# Patient Record
Sex: FEMALE | Race: WHITE | NOT HISPANIC OR LATINO | URBAN - METROPOLITAN AREA
[De-identification: names, ages, dates, MRNs, and addresses within clinical notes are randomized per-mention and may not be internally consistent; named-entity substitution may affect disease eponyms.]

---

## 2017-10-17 ENCOUNTER — ALLIED HEALTH/NURSE VISIT (OUTPATIENT)
Dept: FAMILY MEDICINE | Facility: OTHER | Age: 22
End: 2017-10-17
Payer: COMMERCIAL

## 2017-10-17 ENCOUNTER — TELEPHONE (OUTPATIENT)
Dept: OBGYN | Facility: OTHER | Age: 22
End: 2017-10-17

## 2017-10-17 VITALS
HEIGHT: 67 IN | HEART RATE: 97 BPM | OXYGEN SATURATION: 98 % | DIASTOLIC BLOOD PRESSURE: 64 MMHG | SYSTOLIC BLOOD PRESSURE: 105 MMHG | BODY MASS INDEX: 24.17 KG/M2 | WEIGHT: 154 LBS | RESPIRATION RATE: 16 BRPM

## 2017-10-17 DIAGNOSIS — Z32.01 PREGNANCY TEST POSITIVE: Primary | ICD-10-CM

## 2017-10-17 DIAGNOSIS — Z32.00 UNCONFIRMED PREGNANCY: Primary | ICD-10-CM

## 2017-10-17 DIAGNOSIS — Z32.01 PREGNANCY EXAMINATION OR TEST, POSITIVE RESULT: ICD-10-CM

## 2017-10-17 LAB
B-HCG SERPL-ACNC: 470 IU/L (ref 0–5)
BETA HCG QUAL IFA URINE: POSITIVE

## 2017-10-17 PROCEDURE — 36415 COLL VENOUS BLD VENIPUNCTURE: CPT | Performed by: ADVANCED PRACTICE MIDWIFE

## 2017-10-17 PROCEDURE — 84702 CHORIONIC GONADOTROPIN TEST: CPT | Performed by: ADVANCED PRACTICE MIDWIFE

## 2017-10-17 PROCEDURE — 84703 CHORIONIC GONADOTROPIN ASSAY: CPT | Performed by: ADVANCED PRACTICE MIDWIFE

## 2017-10-17 PROCEDURE — 99207 ZZC NO CHARGE NURSE ONLY: CPT

## 2017-10-17 RX ORDER — METHIMAZOLE 5 MG/1
5 TABLET ORAL DAILY
COMMUNITY
Start: 2017-09-01 | End: 2020-02-03

## 2017-10-17 RX ORDER — LABETALOL 100 MG/1
100 TABLET, FILM COATED ORAL 2 TIMES DAILY
COMMUNITY
Start: 2017-09-01 | End: 2020-02-03

## 2017-10-17 ASSESSMENT — PAIN SCALES - GENERAL: PAINLEVEL: NO PAIN (0)

## 2017-10-17 NOTE — PROGRESS NOTES
Annie Alarcon is a 21 year old here today for a pregnancy test.  LMP: Patient's last menstrual period was 09/10/2017.  Wt: 154 lbs 0 oz.    Symptoms include weight gain, breast tenderness, absence of menses, nausea &/or vomiting and break out.    Annie informed of positive pregnancy test results. JEFFRY: 2018    Educational advice given: nutrition, smoking, drugs & alcohol and mediations.    Current medications reviewed: Yes    Previous pregnancy history remarkable for: none.    Plan: follow-up appointment with Marleny Lozano CNM for pre-julianna care, take multivitamin or pre- vitamins and OB Education packet given.    She is to call back if she has any questions or concerns.  She is advised to notify a provider immediately if she experiences any severe cramping or abdominal pain or any vaginal bleeding.    Discussed medications are class C and D. Advised to contact prescribing provider to discuss Risk verse Benefit of medications and fetus.     Patient is requesting a Dating ultrasound. New patient to [place] but would like to follow here for her pregnancy. Previously seen in [facility] and [facility].    HCG Quant completed in clinic today.  Elida Martinez, RN, BSN

## 2017-10-17 NOTE — MR AVS SNAPSHOT
After Visit Summary   10/17/2017    Annie Alarcon    MRN: [id number]           Patient Information     Date Of Birth          1995        Visit Information        Provider Department      10/17/2017 10:00 AM NL RN TEAM A, SARAH BETH [facility]        Today's Diagnoses     Unconfirmed pregnancy    -  1      Care Instructions    According to your last menstrual period, you are approximately 5 weeks and 2 days pregnant.  Your estimated due date is 06/17/2018.    To do list:  1. If you have not already started taking a prenatal vitamin, please start today.  2. The first OB visit with Marleny Chriss Lozano is scheduled between 10 and 12 weeks: 12/01/2017 at 9am in [place].    To make these appointments, or if you have any questions and would like to speak to your care team, you can call the clinic's main phone number:       [facility]: [phone]       [facility]: [phone]       [place] [facility]: [phone]       [facility]: [phone]       [facility]: [phone]    Please remember, we would like to hear from you if you have any vaginal bleeding or any moderate to severe abdominal pain/cramping. You can call any of the phone numbers above and speak with an RN promptly, even if it is after clinic hours, someone will answer your call.    Thank you for choosing [place], and Congratulations!                Follow-ups after your visit        Your next 10 appointments already scheduled     Dec 01, 2017  9:00 AM CST   New Prenatal with SAL Trevizo CNNIKKI   [facility]    [address] [facility] [id number] [phone]              Who to contact     If you have questions or need follow up information about today's clinic visit or your schedule please contact [facility] directly at [phone].  Normal or non-critical lab and imaging results will be communicated to you by [place]  "letter or phone within 4 business days after the clinic has received the results. If you do not hear from us within 7 days, please contact the clinic through iTwin or phone. If you have a critical or abnormal lab result, we will notify you by phone as soon as possible.  Submit refill requests through iTwin or call your pharmacy and they will forward the refill request to us. Please allow 3 business days for your refill to be completed.          Additional Information About Your Visit        iTwin Information     iTwin lets you send messages to your doctor, view your test results, renew your prescriptions, schedule appointments and more. To sign up, go to [email or web address] . Click on \"Log in\" on the left side of the screen, which will take you to the Welcome page. Then click on \"Sign up Now\" on the right side of the page.     You will be asked to enter the access code listed below, as well as some personal information. Please follow the directions to create your username and password.     Your access code is: [email or web address]  Expires: 1/15/2018 10:41 AM     Your access code will  in 90 days. If you need help or a new code, please call your [place] clinic or [phone].        Care EveryWhere ID     This is your Care EveryWhere ID. This could be used by other organizations to access your [place] medical records  [id number]        Your Vitals Were     Pulse Respirations Height Last Period Pulse Oximetry Breastfeeding?    97 16 5' 7.28\" (1.709 m) 09/10/2017 98% No    BMI (Body Mass Index)                   23.92 kg/m2            Blood Pressure from Last 3 Encounters:   10/17/17 105/64    Weight from Last 3 Encounters:   10/17/17 154 lb (69.9 kg)              We Performed the Following     Beta HCG qual IFA urine - [facility] and [place]     HCG quantitative pregnancy        Primary Care Provider    None Specified       No primary provider on file.        Equal Access to Services     TORY AGUILERA AH: " Hadii diego frost Soshakirali, waaxda luqadaha, qaybta kaalprince orr, kulwinder albertoin hayaadayan montgomerybreanna montgomery laoctaviodayan renaldo. So [facility] [phone].    ATENCIÓN: Si habla nancy, tiene a rivera disposición servicios gratuitos de asistencia lingüística. Llame al [phone].    We comply with applicable federal civil rights laws and [place] laws. We do not discriminate on the basis of race, color, national origin, age, disability, sex, sexual orientation, or gender identity.            Thank you!     Thank you for choosing [facility]  for your care. Our goal is always to provide you with excellent care. Hearing back from our patients is one way we can continue to improve our services. Please take a few minutes to complete the written survey that you may receive in the mail after your visit with us. Thank you!             Your Updated Medication List - Protect others around you: Learn how to safely use, store and throw away your medicines at www.disposemymeds.org.          This list is accurate as of: 10/17/17 10:41 AM.  Always use your most recent med list.                   Brand Name Dispense Instructions for use Diagnosis    labetalol 100 MG tablet    NORMODYNE     Take 100 mg by mouth 2 times daily    Unconfirmed pregnancy       methimazole 5 MG tablet    TAPAZOLE     Take 5 mg by mouth daily    Unconfirmed pregnancy

## 2017-10-17 NOTE — PATIENT INSTRUCTIONS
According to your last menstrual period, you are approximately 5 weeks and 2 days pregnant.  Your estimated due date is 06/17/2018.    To do list:  1. If you have not already started taking a prenatal vitamin, please start today.  2. The first OB visit with Marleny Banerjee Marta is scheduled between 10 and 12 weeks: 12/01/2017 at 9am in [place].    To make these appointments, or if you have any questions and would like to speak to your care team, you can call the clinic's main phone number:       [facility]: [phone]       [facility]: [phone]       [place] [facility]: [phone]       [facility]: [phone]       [facility]: [phone]    Please remember, we would like to hear from you if you have any vaginal bleeding or any moderate to severe abdominal pain/cramping. You can call any of the phone numbers above and speak with an RN promptly, even if it is after clinic hours, someone will answer your call.    Thank you for choosing [place], and Congratulations!

## 2017-10-19 ENCOUNTER — NURSE TRIAGE (OUTPATIENT)
Dept: NURSING | Facility: CLINIC | Age: 22
End: 2017-10-19

## 2017-10-19 DIAGNOSIS — Z32.01 PREGNANCY EXAMINATION OR TEST, POSITIVE RESULT: ICD-10-CM

## 2017-10-19 LAB — B-HCG SERPL-ACNC: 1064 IU/L (ref 0–5)

## 2017-10-19 PROCEDURE — 84702 CHORIONIC GONADOTROPIN TEST: CPT | Performed by: ADVANCED PRACTICE MIDWIFE

## 2017-10-19 PROCEDURE — 36415 COLL VENOUS BLD VENIPUNCTURE: CPT | Performed by: ADVANCED PRACTICE MIDWIFE

## 2017-10-20 ENCOUNTER — TELEPHONE (OUTPATIENT)
Dept: OBGYN | Facility: OTHER | Age: 22
End: 2017-10-20

## 2017-10-20 NOTE — TELEPHONE ENCOUNTER
I called and spoke to patient about her lab results.  I recommended patient wait at least a week to have her ultrasound based on her quant results.  Patient is in agreement.  Ultrasound order has already been placed.  All questions answered and patient verbalizes understanding.    Casandra Sim

## 2017-10-20 NOTE — TELEPHONE ENCOUNTER
Annie calling to find out HCG level drawn today.  Las level: 470, drawn on 10/17/17.  Remains in process at this time, advised to call back in 1-2 hrs.      Additional Information    [1] Follow-up call to recent contact AND [2] information only call, no triage required    Protocols used: INFORMATION ONLY CALL-ADULT-

## 2017-10-20 NOTE — TELEPHONE ENCOUNTER
Annie calling back, results from today's lab draw (HCG): 1064.      Additional Information    [1] Follow-up call to recent contact AND [2] information only call, no triage required    Protocols used: INFORMATION ONLY CALL-ADULT-

## 2017-10-30 ENCOUNTER — RADIANT APPOINTMENT (OUTPATIENT)
Dept: ULTRASOUND IMAGING | Facility: OTHER | Age: 22
End: 2017-10-30
Attending: ADVANCED PRACTICE MIDWIFE
Payer: COMMERCIAL

## 2017-10-30 DIAGNOSIS — Z32.01 PREGNANCY TEST POSITIVE: ICD-10-CM

## 2017-10-30 PROCEDURE — 76817 TRANSVAGINAL US OBSTETRIC: CPT

## 2017-10-30 PROCEDURE — 76801 OB US < 14 WKS SINGLE FETUS: CPT

## 2017-10-31 ENCOUNTER — TELEPHONE (OUTPATIENT)
Dept: OBGYN | Facility: OTHER | Age: 22
End: 2017-10-31

## 2017-10-31 NOTE — TELEPHONE ENCOUNTER
Reason for Call:  Other     Detailed comments: pt returning Chriss Days call regarding results. Please contact pt in regards and go over results.    Phone Number Patient can be reached at: Home number on file [phone] (home)    Best Time: ANY    Can we leave a detailed message on this number? YES    Call taken on 10/31/2017 at 10:09 AM by Angelina Esparza

## 2017-11-28 ENCOUNTER — PRENATAL OFFICE VISIT (OUTPATIENT)
Dept: OBGYN | Facility: OTHER | Age: 22
End: 2017-11-28
Payer: COMMERCIAL

## 2017-11-28 VITALS
HEART RATE: 96 BPM | HEIGHT: 67 IN | BODY MASS INDEX: 25.35 KG/M2 | DIASTOLIC BLOOD PRESSURE: 66 MMHG | SYSTOLIC BLOOD PRESSURE: 118 MMHG | WEIGHT: 161.5 LBS

## 2017-11-28 DIAGNOSIS — I47.10 SVT (SUPRAVENTRICULAR TACHYCARDIA) (H): ICD-10-CM

## 2017-11-28 DIAGNOSIS — Z34.81 ENCOUNTER FOR SUPERVISION OF OTHER NORMAL PREGNANCY IN FIRST TRIMESTER: Primary | ICD-10-CM

## 2017-11-28 DIAGNOSIS — Z23 NEED FOR TDAP VACCINATION: ICD-10-CM

## 2017-11-28 PROBLEM — Z34.80 ENCOUNTER FOR SUPERVISION OF OTHER NORMAL PREGNANCY: Status: ACTIVE | Noted: 2017-11-28

## 2017-11-28 LAB
ABO + RH BLD: NORMAL
ABO + RH BLD: NORMAL
ALBUMIN UR-MCNC: NEGATIVE MG/DL
APPEARANCE UR: CLEAR
BILIRUB UR QL STRIP: NEGATIVE
BLD GP AB SCN SERPL QL: NORMAL
BLOOD BANK CMNT PATIENT-IMP: NORMAL
COLOR UR AUTO: YELLOW
ERYTHROCYTE [DISTWIDTH] IN BLOOD BY AUTOMATED COUNT: 12.6 % (ref 10–15)
GLUCOSE UR STRIP-MCNC: NEGATIVE MG/DL
HCT VFR BLD AUTO: 37.9 % (ref 35–47)
HGB BLD-MCNC: 12.6 G/DL (ref 11.7–15.7)
HGB UR QL STRIP: NEGATIVE
KETONES UR STRIP-MCNC: NEGATIVE MG/DL
LEUKOCYTE ESTERASE UR QL STRIP: ABNORMAL
MCH RBC QN AUTO: 28.6 PG (ref 26.5–33)
MCHC RBC AUTO-ENTMCNC: 33.2 G/DL (ref 31.5–36.5)
MCV RBC AUTO: 86 FL (ref 78–100)
NITRATE UR QL: NEGATIVE
PH UR STRIP: 6.5 PH (ref 5–7)
PLATELET # BLD AUTO: 170 10E9/L (ref 150–450)
RBC # BLD AUTO: 4.41 10E12/L (ref 3.8–5.2)
SOURCE: ABNORMAL
SP GR UR STRIP: 1.02 (ref 1–1.03)
SPECIMEN EXP DATE BLD: NORMAL
UROBILINOGEN UR STRIP-ACNC: 1 EU/DL (ref 0.2–1)
WBC # BLD AUTO: 11.9 10E9/L (ref 4–11)

## 2017-11-28 PROCEDURE — 86780 TREPONEMA PALLIDUM: CPT | Performed by: ADVANCED PRACTICE MIDWIFE

## 2017-11-28 PROCEDURE — 81003 URINALYSIS AUTO W/O SCOPE: CPT | Performed by: ADVANCED PRACTICE MIDWIFE

## 2017-11-28 PROCEDURE — G0499 HEPB SCREEN HIGH RISK INDIV: HCPCS | Performed by: ADVANCED PRACTICE MIDWIFE

## 2017-11-28 PROCEDURE — G0145 SCR C/V CYTO,THINLAYER,RESCR: HCPCS | Performed by: ADVANCED PRACTICE MIDWIFE

## 2017-11-28 PROCEDURE — 87491 CHLMYD TRACH DNA AMP PROBE: CPT | Performed by: ADVANCED PRACTICE MIDWIFE

## 2017-11-28 PROCEDURE — 86901 BLOOD TYPING SEROLOGIC RH(D): CPT | Performed by: ADVANCED PRACTICE MIDWIFE

## 2017-11-28 PROCEDURE — 36415 COLL VENOUS BLD VENIPUNCTURE: CPT | Performed by: ADVANCED PRACTICE MIDWIFE

## 2017-11-28 PROCEDURE — 87591 N.GONORRHOEAE DNA AMP PROB: CPT | Performed by: ADVANCED PRACTICE MIDWIFE

## 2017-11-28 PROCEDURE — 99207 ZZC FIRST OB VISIT: CPT | Performed by: ADVANCED PRACTICE MIDWIFE

## 2017-11-28 PROCEDURE — 87389 HIV-1 AG W/HIV-1&-2 AB AG IA: CPT | Performed by: ADVANCED PRACTICE MIDWIFE

## 2017-11-28 PROCEDURE — 86850 RBC ANTIBODY SCREEN: CPT | Performed by: ADVANCED PRACTICE MIDWIFE

## 2017-11-28 PROCEDURE — 85027 COMPLETE CBC AUTOMATED: CPT | Performed by: ADVANCED PRACTICE MIDWIFE

## 2017-11-28 PROCEDURE — 86900 BLOOD TYPING SEROLOGIC ABO: CPT | Performed by: ADVANCED PRACTICE MIDWIFE

## 2017-11-28 PROCEDURE — 87086 URINE CULTURE/COLONY COUNT: CPT | Performed by: ADVANCED PRACTICE MIDWIFE

## 2017-11-28 PROCEDURE — 86762 RUBELLA ANTIBODY: CPT | Performed by: ADVANCED PRACTICE MIDWIFE

## 2017-11-28 RX ORDER — PROPYLTHIOURACIL 50 MG/1
50 TABLET ORAL
COMMUNITY
End: 2020-02-03

## 2017-11-28 RX ORDER — PRENATAL VIT/IRON FUM/FOLIC AC 27MG-0.8MG
1 TABLET ORAL DAILY
COMMUNITY

## 2017-11-28 ASSESSMENT — ANXIETY QUESTIONNAIRES
1. FEELING NERVOUS, ANXIOUS, OR ON EDGE: MORE THAN HALF THE DAYS
3. WORRYING TOO MUCH ABOUT DIFFERENT THINGS: SEVERAL DAYS
2. NOT BEING ABLE TO STOP OR CONTROL WORRYING: NOT AT ALL
5. BEING SO RESTLESS THAT IT IS HARD TO SIT STILL: NOT AT ALL
7. FEELING AFRAID AS IF SOMETHING AWFUL MIGHT HAPPEN: NOT AT ALL
6. BECOMING EASILY ANNOYED OR IRRITABLE: NEARLY EVERY DAY
IF YOU CHECKED OFF ANY PROBLEMS ON THIS QUESTIONNAIRE, HOW DIFFICULT HAVE THESE PROBLEMS MADE IT FOR YOU TO DO YOUR WORK, TAKE CARE OF THINGS AT HOME, OR GET ALONG WITH OTHER PEOPLE: SOMEWHAT DIFFICULT
GAD7 TOTAL SCORE: 6

## 2017-11-28 ASSESSMENT — PATIENT HEALTH QUESTIONNAIRE - PHQ9
SUM OF ALL RESPONSES TO PHQ QUESTIONS 1-9: 7
5. POOR APPETITE OR OVEREATING: NOT AT ALL

## 2017-11-28 NOTE — MR AVS SNAPSHOT
After Visit Summary   11/28/2017    Annie Alarcon    MRN: [id number]           Patient Information     Date Of Birth          1995        Visit Information        Provider Department      11/28/2017 10:00 AM Marleny Soto APRN [facility]        Today's Diagnoses     Encounter for supervision of other normal pregnancy in first trimester    -  1    Need for Tdap vaccination        SVT (supraventricular tachycardia) (H)          Care Instructions    How to prevent CMV or cytomegalovirus infection while you are pregnant:    Thoroughly wash your hands with soap and warm water especially after doing things such as:  Diaper changes  Feeding or bathing a child  Wiping a child's runny nose or drool  Handling a child's toys    Do not share cups, plates, utensils, toothbrushes or food with your children  Do not kiss young children on the mouth or cheek. Instead, kiss them on the head or give them a hug.  Do not share towels or washcloths with young children.  Clean toy, cou.nter tops, and other surfaces that come in contact with urine or saliva.\      LISTERIA  Individuals can reduce the risk for listeria contamination through proper food selection, handling, and storage, such as:  Rinsing raw produce (fuits and vegetables) before eating, cutting, or cooking;  Keeping refrigerators at 40 degrees F or lower;  Buying soft cheeses only if their labels state that they are made with pasteurized milk.  Also avoiding cheese that has not been initially wrapped in plastic.  These cheeses include brie, camembert, blue and the soft Mexican cheeses like con queso.  Heating all food that can be heated but especially hot dogs, luncheon meats, and cold cuts to an internal temperature of 165 degrees F or until steaming hot before serving them; and  Washing your hands for at least 20 seconds with warm water and soap before and after handling cantaloupes or other melons.  Watch for food recalls  for listeria and contact us if you believe you have been exposed.               First line remedies for nausea in pregnancy    Eat small frequent meals every 2-3 hours if possible.   Avoid food at extremes of temparture and drinks with carbination.  Eat foods that appeal to you, avoiding fats and spicy foods.  Bread, pasta, crackers, potatoes, and rice tend to be tolerated the best.  Don't worry about what you eat in the first 3 months, it is more important that you can eat and keep it down.   Try flat ginger ale.  Ginger is a herbal remedy for nausea and you can use it in any form.  There are ginger tablets you can purchase.  The dose is 500 to 1000 mg a day.   You may also try doxylamine 12.5 mg three times a day which is a sleeping medication along with Vitamin B6 25 mg three times a day.  This combination takes up to a week to work so give it some time.   Doxylamine is sometimes called Unisom and it comes in 25 mg tablets so you will have to break it in half and take half a tablet.   If you begin to vomit more than 5 or 6 times a day and feel that you are unable to keep anything down, call the clinic for an appointment to be seen.  [place] [phone].        Thank for choosing our clinic for your health care needs. Our goal is to provided you with excellent care. One way that we continue to improve our care is by listening to our patients. Please take a few minutes to complete the written survey that you may receive in the mail after your visit.     You may reach your care team by calling the following number:    [place]- [phone]   For clinic hours at [facility]  please visit the [place] web site [email or web address]    Notification of your lab results:  Normal or non-critical lab and imaging results will be communicated to you by Mychart, letter, or phone within 7 days. If you do not hear from us within 10 days, please contact us through Mychart or phone. If you have a critical or  abnormal lab result, we will notify you by phone as soon as possible.      After Hours nurse advice:  [place] [facility]:  [phone]     Medication Refills:  Please contact your pharmacy and they will forward the refill to us. Please allow 3 business days for your refills to be completed.     Secure access to your medical record:  Use GlucoVista (secure email communication and access to your chart) to send your primary care provider a message or make an appointment. Ask someone on your Team how to sign up for [facility] [employer]. To log on to [employer]isk [employer]see) or for more information in GlucoVista please visit the website at [email or web address].      OBGYN Care Team               Marleny Lozano CNM   Clinic hours: Tuesday 8-5 , Thursday 1145-7 and every other Friday 8-5 at [place]   Wednesday 8-5 at [place] Sim DO  Clinic hours 7-6 Monday at [place]  8-5 Tuesdays at [place]  7-12 Fridays [facility]  1-5 Fridays at [place]    Maribell Matta MD  Clinic hours: [place] Monday and Thursday 8:15-5  [place] and Friday 8-5                Follow-ups after your visit        Follow-up notes from your care team     Return in about 1 month (around 12/28/2017).      Who to contact     If you have questions or need follow up information about today's clinic visit or your schedule please contact [facility] directly at [phone].  Normal or non-critical lab and imaging results will be communicated to you by MyChart, letter or phone within 4 business days after the clinic has received the results. If you do not hear from us within 7 days, please contact the clinic through NetDragonhart or phone. If you have a critical or abnormal lab result, we will notify you by phone as soon as possible.  Submit refill requests through [employer] or call your pharmacy and they will forward the refill request to us. Please allow 3 business days for your refill to be completed.          Additional Information About Your  "Visit        MyChart Information     Cloudstaffhart gives you secure access to your electronic health record. If you see a primary care provider, you can also send messages to your care team and make appointments. If you have questions, please call your primary care clinic.  If you do not have a primary care provider, please call [phone] and they will assist you.        Care EveryWhere ID     This is your Care EveryWhere ID. This could be used by other organizations to access your [place] medical records  [id number]        Your Vitals Were     Pulse Height Last Period BMI (Body Mass Index)          96 5' 7\" (1.702 m) 09/10/2017 25.29 kg/m2         Blood Pressure from Last 3 Encounters:   11/28/17 118/66   10/17/17 105/64    Weight from Last 3 Encounters:   11/28/17 161 lb 8 oz (73.3 kg)   10/17/17 154 lb (69.9 kg)              We Performed the Following     ABO/Rh type and screen     Anti Treponema     CBC with platelets     Chlamydia trachomatis PCR     Hepatitis B surface antigen     HIV Antigen Antibody Combo     Neisseria gonorrhoeae PCR     Pap imaged thin layer screen only - recommended age 21 - 24 years     Rubella Antibody IgG Quantitative     UA without Microscopic     Urine Culture Aerobic Bacterial        Primary Care Provider Fax #    Physician No Ref-Primary [phone]       No address on file        Equal Access to Services     TORY AGUILERA : Hadii diego ku hadasho Soomaali, waaxda luqadaha, qaybta kaalmada adeegyada, waxay liudmila lino . So [facility] [phone].    ATENCIÓN: Si habla español, tiene a rivera disposición servicios gratuitos de asistencia lingüística. Llame al [phone].    We comply with applicable federal civil rights laws and [place] laws. We do not discriminate on the basis of race, color, national origin, age, disability, sex, sexual orientation, or gender identity.            Thank you!     Thank you for choosing [facility]  for your care. Our " goal is always to provide you with excellent care. Hearing back from our patients is one way we can continue to improve our services. Please take a few minutes to complete the written survey that you may receive in the mail after your visit with us. Thank you!             Your Updated Medication List - Protect others around you: Learn how to safely use, store and throw away your medicines at www.disposemymeds.org.          This list is accurate as of: 11/28/17 10:57 AM.  Always use your most recent med list.                   Brand Name Dispense Instructions for use Diagnosis    labetalol 100 MG tablet    NORMODYNE     Take 100 mg by mouth 2 times daily    Unconfirmed pregnancy       methimazole 5 MG tablet    TAPAZOLE     Take 5 mg by mouth daily    Unconfirmed pregnancy       prenatal multivitamin plus iron 27-0.8 MG Tabs per tablet      Take 1 tablet by mouth daily        propylthiouracil 50 MG tablet    PTU     Take 50 mg by mouth 2 times daily

## 2017-11-28 NOTE — PROGRESS NOTES
Annie Alarcon is a 21 year old  who presents to the clinic for an new ob visit.   Estimated Date of Delivery: 2018 is calculated from Patient's last menstrual period was 09/10/2017.       She has not had bleeding since her LMP.   She has had mild nausea. Weigh loss has not occurred.     HISTORY  updated and reviewed  Past Medical History:   Diagnosis Date     Graves disease      SVT (supraventricular tachycardia) (H)      Past Surgical History:   Procedure Laterality Date     NO HISTORY OF SURGERY       Social History     Social History     Marital status: Single     Spouse name: N/A     Number of children: N/A     Years of education: N/A     Occupational History     Not on file.     Social History Main Topics     Smoking status: Current Every Day Smoker     Packs/day: 0.50     Types: Cigarettes     Smokeless tobacco: Never Used     Alcohol use No     Drug use: Yes     Special: Marijuana     Sexual activity: Yes     Partners: Male     Birth control/ protection: None     Other Topics Concern     Not on file     Social History Narrative     Health Maintenance   Topic Date Due     CHLAMYDIA SCREENING  1993     HPV IMMUNIZATION (1 of 3 - Female 3 Dose Series) 12/15/2004     TETANUS IMMUNIZATION (SYSTEM ASSIGNED)  12/15/2011     PAP SCREENING Q3 YR (SYSTEM ASSIGNED)  12/15/2014     INFLUENZA VACCINE (SYSTEM ASSIGNED)  2017     MATERNAL SCREENING  2017     Family History   Problem Relation Age of Onset     Thyroid Disease Maternal Grandmother      Breast Cancer Maternal Grandmother      Asthma Brother            REVIEW OF SYSTEMS  C: NEGATIVE for fever, chills  I: NEGATIVE for worrisome rashes, moles or lesions  E: NEGATIVE for vision changes   E/M: NEGATIVE for ear, mouth and throat problems  R: NEGATIVE for significant cough or SOB  B: NEGATIVE for masses, tenderness or discharge  CV: NEGATIVE for chest pain, palpitations   GI: NEGATIVE for abdominal pain, heartburn, or change in  bowel habits  : NEGATIVE for frequency, dysuria, or hematuria  M: NEGATIVE for significant arthralgias or myalgia  N: NEGATIVE for weakness, dizziness or paresthesias or headache  E: NEGATIVE for temperature intolerance, skin/hair changes  H: NEGATIVE for bleeding problems  P: NEGATIVE for changes in mood or affect        PHYSICAL EXAM  LMP 09/10/2017  GENERAL:  Pleasant pregnant female, alert, cooperative  and well groomed.  SKIN:  Warm and dry, without lesions or rashes  HEAD: Symmetrical features.  EYES:  PERRLA.  EARS: Tympanic membranes gray, translucent and intact.  MOUTH:  Buccal mucosa pink, moist without lesions.  Teeth in good repair.    NECK:  Thyroid without enlargement and nodules.  Lymph nodes not palpable.   LUNGS:  Clear to auscultation.  BREAST:  Symmetrical.  No dominant, fixed or suspicious masses are noted.  No skin or nipple changes or axillary nodes.    Nipples everted.      HEART:  RRR with  out murmur.  ABDOMEN: Soft without masses , tenderness or organomegaly.  No CVA tenderness.  Uterus palpable at size equal to dates.  No scars noted..  MUSCULOSKELETAL:  Full range of motion  GENITALIA: EGBUS normal. Vulva reveals no erythema or lesions.       VAGINA:  pink, normal ruga and discharge, no lesions.        CERVIX:  smooth, without discharge or CMT .                  EXTREMITIES:  No edema. No significant varicosities.    ASSESSMENT:  Intrauterine pregnancy of 10w1d  (Z34.81) Encounter for supervision of other normal pregnancy in first trimester  (primary encounter diagnosis)  Comment:   Plan: UA without Microscopic, Urine Culture Aerobic         Bacterial, CBC with platelets, HIV Antigen         Antibody Combo, Rubella Antibody IgG         Quantitative, Hepatitis B surface antigen, Anti        Treponema, ABO/Rh type and screen, Chlamydia         trachomatis PCR, Neisseria gonorrhoeae PCR, Pap        imaged thin layer screen only - recommended age        21 - 24 years               PLAN:  Options for  testing for chromosomal and birth defects were discussed with the patient. Diagnostic tests include CVS and Amniocentesis. We discussed that these tests are definitive but invasive and do carry a risk of fetal loss.   Screening tests include nuchal translucency/blood marker testing in the first trimester and quad screening in the second trimester. We discussed that these are screening tests and not diagnostic tests and that false positives and negatives are a distinct possibility.  Declines  testing.    Instructed on best evidence for: weight gain for her weight and height for pregnancy; healthy diet and foods to avoid; exercise and activity during pregnancy;avoiding exposure to toxoplasmosis; and maintenance of a generally healthy lifestyle.     [place] [facility] [place]  Probably but not certain.  Reviewed options  Reviewed transmission of and avoidance strategies for CMV.    Discussed travel cautions.    She had her parnter  have   not traveled to areas currently infected with zika in the past 6 months and currently have  no plans to travel to an area infected with Zika.   If travel plans change they will inform us.  Discussed SVT and when to go to the ED.           Genetic Screening  At the time of birth, will you be 35 years old or older?  No  Has the patient, baby s father, or anyone in either family had:  Thalassemia (Italian, Greek, Mediterranean, or  background only) and an MCV result less than 80?  No  Neural tube defect such as meningomyelocele, spina bifida or anencephaly? No  Congenital heart defect? No  Down s syndrome?  No  Jose-Sach s disease (Anabaptism, Cajun, Bengali-Garrison)? No  Sickle cell disease or trait ([place])?  No  Muscular dystrophy?  No  Cystic Fibrosis?  No  Cassia s chorea? No  Mental retardation/autism?  No   If yes, was the person tested for fragile X?  No  Any other inherited genetic or chromosomal disorder? No  Maternal  metabolic disorder (e.g. insulin-dependent diabetes, PKU)?  No  A child with birth defects not listed above? No  Recurrent pregnancy loss or a stillbirth?  No  Does the patient or baby s father have any other genetic risks? No  Infection History  Do we have your permission to complete routine prenatal lab tests.  This includes Hepatitis B, HIV? Yes:   Do you feel that you are at high risk for coming in contact with the AIDS virus? No  Have you ever been treated for tuberculosis?  No  Have you ever received the BCG vaccine for tuberculosis? No  Do you live with someone who has tuberculosis? No   Have you ever been exposed to tuberculosis?  No  Do you have genital herpes?  No  Does your partner have genital herpes?  No  Have you had a rash or viral illness since your last period?  No  Have you ever had Gonorrhea, Chlamydia, Syphilis, venereal warts, trichomoniasis, pelvic inflammatory disease or any other sexually transmitted disease?  No  Have you had chicken pox?  Yes:   Have you been vaccinated against chicken pox?  No  Have you had any other infectious disease?  No

## 2017-11-28 NOTE — NURSING NOTE
"Chief Complaint   Patient presents with     Prenatal Care       Initial /66 (BP Location: Left arm, Patient Position: Chair, Cuff Size: Adult Regular)  Pulse 96  Ht 5' 7\" (1.702 m)  Wt 161 lb 8 oz (73.3 kg)  LMP 09/10/2017  BMI 25.29 kg/m2 Estimated body mass index is 25.29 kg/(m^2) as calculated from the following:    Height as of this encounter: 5' 7\" (1.702 m).    Weight as of this encounter: 161 lb 8 oz (73.3 kg).  Medication Reconciliation: complete   Lisa Bonilla CMA      "

## 2017-11-28 NOTE — PATIENT INSTRUCTIONS
How to prevent CMV or cytomegalovirus infection while you are pregnant:    Thoroughly wash your hands with soap and warm water especially after doing things such as:  Diaper changes  Feeding or bathing a child  Wiping a child's runny nose or drool  Handling a child's toys    Do not share cups, plates, utensils, toothbrushes or food with your children  Do not kiss young children on the mouth or cheek. Instead, kiss them on the head or give them a hug.  Do not share towels or washcloths with young children.  Clean toy, cou.nter tops, and other surfaces that come in contact with urine or saliva.\      LISTERIA  Individuals can reduce the risk for listeria contamination through proper food selection, handling, and storage, such as:  Rinsing raw produce (fuits and vegetables) before eating, cutting, or cooking;  Keeping refrigerators at 40 degrees F or lower;  Buying soft cheeses only if their labels state that they are made with pasteurized milk.  Also avoiding cheese that has not been initially wrapped in plastic.  These cheeses include brie, camembert, blue and the soft Mexican cheeses like con queso.  Heating all food that can be heated but especially hot dogs, luncheon meats, and cold cuts to an internal temperature of 165 degrees F or until steaming hot before serving them; and  Washing your hands for at least 20 seconds with warm water and soap before and after handling cantaloupes or other melons.  Watch for food recalls for listeria and contact us if you believe you have been exposed.               First line remedies for nausea in pregnancy    Eat small frequent meals every 2-3 hours if possible.   Avoid food at extremes of temparture and drinks with carbination.  Eat foods that appeal to you, avoiding fats and spicy foods.  Bread, pasta, crackers, potatoes, and rice tend to be tolerated the best.  Don't worry about what you eat in the first 3 months, it is more important that you can eat and keep it down.    Try flat ginger ale.  Ginger is a herbal remedy for nausea and you can use it in any form.  There are ginger tablets you can purchase.  The dose is 500 to 1000 mg a day.   You may also try doxylamine 12.5 mg three times a day which is a sleeping medication along with Vitamin B6 25 mg three times a day.  This combination takes up to a week to work so give it some time.   Doxylamine is sometimes called Unisom and it comes in 25 mg tablets so you will have to break it in half and take half a tablet.   If you begin to vomit more than 5 or 6 times a day and feel that you are unable to keep anything down, call the clinic for an appointment to be seen.  [facility] [phone].        Thank for choosing our clinic for your health care needs. Our goal is to provided you with excellent care. One way that we continue to improve our care is by listening to our patients. Please take a few minutes to complete the written survey that you may receive in the mail after your visit.     You may reach your care team by calling the following number:    [facility]- [phone]   For clinic hours at [facility]  please visit the [facility] web site [email or web address]    Notification of your lab results:  Normal or non-critical lab and imaging results will be communicated to you by Style Jukeboxhart, letter, or phone within 7 days. If you do not hear from us within 10 days, please contact us through [employer] or phone. If you have a critical or abnormal lab result, we will notify you by phone as soon as possible.      After Hours nurse advice:  [facility]:  [phone]     Medication Refills:  Please contact your pharmacy and they will forward the refill to us. Please allow 3 business days for your refills to be completed.     Secure access to your medical record:  Use [email or web address] (secure email communication and access to your chart) to send your primary care provider a message or make an appointment. Ask someone on your Team how  to sign up for [employer]. To log on to [employer] or for more information in [employer]ista please visit the website at [email or web address].      OBGYN Care Team               Marleny Lozano CNM   Clinic hours: Tuesday 8-5 , Thursday 1145-7 and every other Friday 8-5 at [place]   Wednesday 8-5 at [place] [facility] DO  Clinic hours 7-6 Monday at [place]  8-5 Tuesdays at [place]  7-12 Fridays [facility]  1-5 Fridays at [place]    Maribell Matta MD  Clinic hours: [place] Monday and Thursday 8:15-5  [place] and Friday 8-5

## 2017-11-29 ENCOUNTER — NURSE TRIAGE (OUTPATIENT)
Dept: NURSING | Facility: CLINIC | Age: 22
End: 2017-11-29

## 2017-11-29 ENCOUNTER — TELEPHONE (OUTPATIENT)
Dept: OBGYN | Facility: OTHER | Age: 22
End: 2017-11-29

## 2017-11-29 DIAGNOSIS — A74.9 CHLAMYDIA INFECTION DURING PREGNANCY: Primary | ICD-10-CM

## 2017-11-29 DIAGNOSIS — O98.819 CHLAMYDIA INFECTION DURING PREGNANCY: Primary | ICD-10-CM

## 2017-11-29 LAB
BACTERIA SPEC CULT: NO GROWTH
C TRACH DNA SPEC QL NAA+PROBE: POSITIVE
HBV SURFACE AG SERPL QL IA: NONREACTIVE
HIV 1+2 AB+HIV1 P24 AG SERPL QL IA: NONREACTIVE
Lab: NORMAL
N GONORRHOEA DNA SPEC QL NAA+PROBE: NEGATIVE
RUBV IGG SERPL IA-ACNC: 15 IU/ML
SPECIMEN SOURCE: ABNORMAL
SPECIMEN SOURCE: NORMAL
SPECIMEN SOURCE: NORMAL
T PALLIDUM IGG+IGM SER QL: NEGATIVE

## 2017-11-29 RX ORDER — AZITHROMYCIN 1 G/1
1 POWDER, FOR SUSPENSION ORAL ONCE
Qty: 1 EACH | Refills: 0 | Status: SHIPPED | OUTPATIENT
Start: 2017-11-29 | End: 2017-11-29

## 2017-11-29 ASSESSMENT — ANXIETY QUESTIONNAIRES: GAD7 TOTAL SCORE: 6

## 2017-11-29 NOTE — TELEPHONE ENCOUNTER
Azithromycin sent for one dose partner will need to be treated as well.   She will need recheck in 3 weeks order placed.     Thank you  Niocle Landry CNP

## 2017-11-29 NOTE — TELEPHONE ENCOUNTER
Left message for patient to call back. Please see message below.    Will route this encounter to JKD to result on the rest of the labs.     Forms for MD faxed.  Linda Díaz CMA

## 2017-11-29 NOTE — TELEPHONE ENCOUNTER
"Caller is returning phone call from clinic; Message read to patient verbatim;  \"Nicole Landry APRN CNP        11/29/17 4:21 PM   Note      Azithromycin sent for one dose partner will need to be treated as well.   She will need recheck in 3 weeks order placed.      Thank you  Nicole Landry CNP        Caller inquires what other test she had done at  OB visit; tests read to patient.  No furthter questions  Understands that she need to take medication as prescribed and partner needs lillian treated as well.        Marce Monte RN  FNA    Additional Information    [1] Follow-up call to recent contact AND [2] information only call, no triage required    Protocols used: INFORMATION ONLY CALL-ADULT-    "

## 2017-11-30 ENCOUNTER — TELEPHONE (OUTPATIENT)
Dept: OBGYN | Facility: OTHER | Age: 22
End: 2017-11-30

## 2017-11-30 DIAGNOSIS — A74.9 CHLAMYDIA INFECTION: Primary | ICD-10-CM

## 2017-11-30 PROBLEM — O98.819 CHLAMYDIA INFECTION AFFECTING PREGNANCY: Status: ACTIVE | Noted: 2017-11-30

## 2017-11-30 LAB
COPATH REPORT: NORMAL
PAP: NORMAL

## 2017-11-30 RX ORDER — AZITHROMYCIN 500 MG/1
1000 TABLET, FILM COATED ORAL ONCE
Qty: 2 TABLET | Refills: 0 | Status: SHIPPED | OUTPATIENT
Start: 2017-11-30 | End: 2017-11-30

## 2017-11-30 NOTE — TELEPHONE ENCOUNTER
Left message for patient to return call to clinic. Please inform patient new prescription sent to pharmacy.  Lisa Bonilla CMA

## 2017-11-30 NOTE — TELEPHONE ENCOUNTER
Message from pharmacy: Azithromycin 1 gm powder  is not covered. Can you send new rx fo tablets.  WS is not in today and we do have message for patient to call us back regarding her results.

## 2017-12-26 ENCOUNTER — TELEPHONE (OUTPATIENT)
Dept: OBGYN | Facility: OTHER | Age: 22
End: 2017-12-26

## 2017-12-26 DIAGNOSIS — Z11.3 SCREEN FOR STD (SEXUALLY TRANSMITTED DISEASE): Primary | ICD-10-CM

## 2017-12-26 NOTE — TELEPHONE ENCOUNTER
Discussed differences in STDs.  Trichomoniasis  Trichomoniasis, also known as  trich , is also a common STD caused  by infection with a parasite called Trichomonas vaginalis.    Chlamydia  Chlamydia is a disease caused by infection with the bacteria called  Chlamydia trachomatis.    Patient was treated for chlamydia on 11/28/2017    States that her boyfriend was treating for something [facility].    advised her to be tested again for both to verify that she has not contracted another STD or still has the first one..    Will have her leave chlamydia trachomatis (previously ordered)  and wet prep (ordered) tomorrow am at  in [place].    Rashad Crabtree, RN, BSN

## 2017-12-26 NOTE — TELEPHONE ENCOUNTER
Reason for Call:  Other , patient question    Detailed comments: patient calling.  She would like a detailed explanation on chlamydia trachomatis.  She wants to know if chlamydia and trachomatis is two separate things.  She was treated for chlamydia but her boyfriend was treated for trachomatis and she is confused on why they are separate things?  She would like a call as soon as possible.  Thank you    Phone Number Patient can be reached at: Home number on file [phone] (home)    Best Time: any    Can we leave a detailed message on this number? YES    Call taken on 12/26/2017 at 2:58 PM by Reva Manriquez

## 2017-12-27 ENCOUNTER — TELEPHONE (OUTPATIENT)
Dept: FAMILY MEDICINE | Facility: CLINIC | Age: 22
End: 2017-12-27

## 2017-12-27 NOTE — TELEPHONE ENCOUNTER
Left message for pt. Please ask patient if she would like to switch the telephone visit to an office with Marleny lawler in [place]. If so, please help pt reschedule the appt.

## 2017-12-27 NOTE — TELEPHONE ENCOUNTER
Left message for patient to call back. Looking to get her switched to OB to see Livier today 12/27.

## 2017-12-28 DIAGNOSIS — Z11.3 SCREEN FOR STD (SEXUALLY TRANSMITTED DISEASE): ICD-10-CM

## 2017-12-28 DIAGNOSIS — O98.819 CHLAMYDIA INFECTION DURING PREGNANCY: ICD-10-CM

## 2017-12-28 DIAGNOSIS — A74.9 CHLAMYDIA INFECTION DURING PREGNANCY: ICD-10-CM

## 2017-12-28 LAB
SPECIMEN SOURCE: NORMAL
WET PREP SPEC: NORMAL

## 2017-12-28 PROCEDURE — 87210 SMEAR WET MOUNT SALINE/INK: CPT | Performed by: FAMILY MEDICINE

## 2017-12-28 PROCEDURE — 87491 CHLMYD TRACH DNA AMP PROBE: CPT | Performed by: NURSE PRACTITIONER

## 2017-12-29 LAB
C TRACH DNA SPEC QL NAA+PROBE: NEGATIVE
SPECIMEN SOURCE: NORMAL

## 2019-11-21 ENCOUNTER — TELEPHONE (OUTPATIENT)
Dept: ENDOCRINOLOGY | Facility: CLINIC | Age: 24
End: 2019-11-21

## 2019-11-21 NOTE — TELEPHONE ENCOUNTER
Please refer to Care Everywhere [facility] for recent lab results and 10/11/19 [facility] Encounter. Patient would now be approximately 14 weeks gestation.  10/10/19  TSH=0.285  TPO=4,158    Left message for patient to return call to review in more detail.    Angela Coleman LPN  Diabetes Clinic Coordinator  Adult Endocrinology and Pediatric Specialty Clinics  [facility]

## 2019-11-21 NOTE — TELEPHONE ENCOUNTER
Pt called to schedule from her referral. I scheduled her in the first spot that was 2 hours, as that's what  stated pt needed, and added her to the wait list as high priority. She states her provider needs to see her sooner. Please review chart and contact pt regarding this.

## 2019-12-26 NOTE — TELEPHONE ENCOUNTER
Patient is now 19 weeks gestation. She has not been on medication for the past few years. Thyroid labs are being managed by [facility] and under control. Patient would like to keep 2/3/20 Consult and have labs rechecked at that time.    Angela Coleman LPN  Diabetes Clinic Coordinator   Adult Endocrinology and Pediatric Specialty Clinics  [facility]

## 2020-02-03 ENCOUNTER — OFFICE VISIT (OUTPATIENT)
Dept: ENDOCRINOLOGY | Facility: CLINIC | Age: 25
End: 2020-02-03
Payer: COMMERCIAL

## 2020-02-03 VITALS
BODY MASS INDEX: 34.41 KG/M2 | OXYGEN SATURATION: 96 % | HEART RATE: 88 BPM | SYSTOLIC BLOOD PRESSURE: 100 MMHG | WEIGHT: 219.7 LBS | DIASTOLIC BLOOD PRESSURE: 67 MMHG

## 2020-02-03 DIAGNOSIS — E06.3 HASHIMOTO'S THYROIDITIS: Primary | ICD-10-CM

## 2020-02-03 LAB — TSH SERPL DL<=0.005 MIU/L-ACNC: 0.87 MU/L (ref 0.4–4)

## 2020-02-03 PROCEDURE — 84443 ASSAY THYROID STIM HORMONE: CPT | Performed by: INTERNAL MEDICINE

## 2020-02-03 PROCEDURE — 36415 COLL VENOUS BLD VENIPUNCTURE: CPT | Performed by: INTERNAL MEDICINE

## 2020-02-03 PROCEDURE — 99204 OFFICE O/P NEW MOD 45 MIN: CPT | Performed by: INTERNAL MEDICINE

## 2020-02-03 NOTE — NURSING NOTE
Annie Alarcon's goals for this visit include:   Chief Complaint   Patient presents with     Consult     Thyroid Disease     Graves     She requests these members of her care team be copied on today's visit information: Yes    PCP: No Ref-Primary, Physician    Referring Provider:  SAL Bush CNM  [facility]  [address] [place] [address]    /67 (BP Location: Left arm, Patient Position: Sitting, Cuff Size: Adult Large)   Pulse 88   Wt 99.7 kg (219 lb 11.2 oz)   SpO2 96%   BMI 34.41 kg/m      Do you need any medication refills at today's visit? No

## 2020-02-03 NOTE — PATIENT INSTRUCTIONS
Lab work today and in April (April lab ok to do with OB)  Thereafter Thyroid labs should be checked at least once a year or more depending on treatment  Follow up as needed

## 2020-02-03 NOTE — PROGRESS NOTES
Endocrinology and Diabetes Clinic    Consulting provider: SAL Bush CNM [facility]  [address] [place] [address]    Reason for consultation: Abnormal thyroid function with high thyroid antibodies    Subjective:   Annie Alarcon is a 24 year old woman with history of subclinical thyrotoxicosis, POTS disease and SVT.  She was first diagnosed in . The patient has problems with intermittent SVT, prob related to POTS.   She was treated with methimazole x 1 months prior to her 2nd pregnancy and then briefly treated with PTU in 2017. She has not been on antithyroid meds since, never took Levothyroxine. The patient now is pregnant with her 3rd child due in 2020. She has been doing ok, SVT not too severe.   The patient was not evaluated during her first pregnancy in .    Current Problem List:   Patient Active Problem List   Diagnosis     SVT (supraventricular tachycardia) (H)     Graves disease     Supervision of other normal pregnancy, antepartum     Need for Tdap vaccination     Chlamydia infection affecting pregnancy       Past Medical and Past Surgical History:  Past Medical History:   Diagnosis Date     Graves disease      SVT (supraventricular tachycardia) (H)        Past Surgical History:   Procedure Laterality Date     NO HISTORY OF SURGERY         Medications:   Current Outpatient Medications   Medication Sig Dispense Refill     Prenatal Vit-Fe Fumarate-FA (PRENATAL MULTIVITAMIN PLUS IRON) 27-0.8 MG TABS per tablet Take 1 tablet by mouth daily         Allergies:   No Known Allergies    Social History     Tobacco Use     Smoking status: Former Smoker     Packs/day: 0.50     Types: Cigarettes     Last attempt to quit: 2018     Years since quittin.0     Smokeless tobacco: Never Used   Substance Use Topics     Alcohol use: No       Family History   Problem Relation Age of Onset     Thyroid Disease Maternal Grandmother      Breast Cancer Maternal Grandmother       Asthma Brother        Review of Systems:  GENERAL: Negative pregant  SKIN: Negative no excessive dryness, no thickening  HENT: no dyshpagia, nro dysphonia nor pain in the anterior neck  EYE: Negative, no dryness, no scratchiness no double vision  HEART: +_ palpiatitions  RESPIRATORY: Negative   GI: Negative  : Negative  MSK: Negative  BLOOD/LYMPH: Negative  NEUROLOGIC: Negative   PSYCH: Negative    Physical Examination:  Blood pressure 100/67, pulse 88, weight 99.7 kg (219 lb 11.2 oz), SpO2 96 %, unknown if currently breastfeeding.  Body mass index is 34.41 kg/m .    Wt Readings from Last 4 Encounters:   02/03/20 99.7 kg (219 lb 11.2 oz)   11/28/17 73.3 kg (161 lb 8 oz)   10/17/17 69.9 kg (154 lb)       General: Well appearing pregnant woman in no distress, no moon facies, no facial plethora, here with her toddler daughter  Eyes: EOMI. Sclerae and conjunctivae are clear.   HENT: No thyromegaly or mass.    Lymphatic: No cervical or supraclavicular lymphadenopathy.  Cardiovascular: RRR, with normal S1+S2 and no murmurs.   Respiratory: Lungs are clear to auscultation.   Gastrointestinal: Abdomen is soft, non tender, and non-distended.   Skin: No rash or lesions. No abdominal striae. No supraclavicular fat pads, no dorsocervical fat pad, no vitiligo  Extremities: No peripheral edema.   Neurologic: No tremor with hands outstretched. 2+ patellar reflexes.    Labs and Studies:   Lab Results   Component Value Date    HGB 12.6 11/28/2017     Lab results from 12/19 TSH 1.08 1019 TSH 0.28   8/19 TSH 0.01   7/19 TSH below 0.01 free T3 3.39 free T4 0.95   10/18 TSH 10.22    4/18 free T3 3.5 free T4 0.8 TSH 0.01  3/18 TSH below 0.01 free T4 0.93  2/18 TSH 0.01     Assessment:  1. Hashimoto's thyroiditis    Young woman with history of subclinical hyperthyroidism and recently very high thyroperoxidase antibodies, hypothyroidism postpartum after her second child.  This presentation was most consistent with Hashimoto's  thyroiditis with elements of Hashitoxicosis.  Cannot rule out mild Graves' disease.  I doubt the patient has a toxic nodule or a toxic nodular goiter as this would not present with TSH fluctuations.  As the patient is at risk for further thyroid hormone fluctuation but evaluate TSH level every 6 weeks during pregnancy and periodically about every 6 months or with symptoms after delivery.  At this point no treatment is indicated.  Overall there is no treatment for the underlying disease of Hashimoto's thyroiditis as this is autoimmune.  Any measures to control her thyroid hormones would seem excessive at this point, however they would include a total thyroidectomy with thyroid hormone replacement or treatment.     Plan:   TSH with reflex fT4 today and in 6 weeks    Orders Placed This Encounter   Procedures     TSH with free T4 reflex     TSH with free T4 reflex   TSI in 4/2020    Kaylynn Simmons MD    Endocrinology and Diabetes  [address] [address]  Pager [phone]

## 2020-02-03 NOTE — LETTER
2/3/2020         RE: Annie Alarcon  [address]  [address]  [facility] [address]        Dear Colleague,    Thank you for referring your patient, Annie Alarcon, to the [facility]. Please see a copy of my visit note below.    [facility]    Consulting provider: SAL Bush CNM [facility]  [address] [place] [address]    Reason for consultation: Abnormal thyroid function with high thyroid antibodies    Subjective:   Annie Alarcon is a 24 year old woman with history of subclinical thyrotoxicosis, POTS disease and SVT.  She was first diagnosed in 2017. The patient has problems with intermittent SVT, prob related to POTS.   She was treated with methimazole x 1 months prior to her 2nd pregnancy and then briefly treated with PTU in 2017. She has not been on antithyroid meds since, never took Levothyroxine. The patient now is pregnant with her 3rd child due in 5/2020. She has been doing ok, SVT not too severe.   The patient was not evaluated during her first pregnancy in 2010.    Current Problem List:   Patient Active Problem List   Diagnosis     SVT (supraventricular tachycardia) (H)     Graves disease     Supervision of other normal pregnancy, antepartum     Need for Tdap vaccination     Chlamydia infection affecting pregnancy       Past Medical and Past Surgical History:  Past Medical History:   Diagnosis Date     Graves disease      SVT (supraventricular tachycardia) (H)        Past Surgical History:   Procedure Laterality Date     NO HISTORY OF SURGERY         Medications:   Current Outpatient Medications   Medication Sig Dispense Refill     Prenatal Vit-Fe Fumarate-FA (PRENATAL MULTIVITAMIN PLUS IRON) 27-0.8 MG TABS per tablet Take 1 tablet by mouth daily         Allergies:   No Known Allergies    Social History     Tobacco Use     Smoking status: Former Smoker     Packs/day: 0.50     Types: Cigarettes     Last attempt  to quit: 2018     Years since quittin.0     Smokeless tobacco: Never Used   Substance Use Topics     Alcohol use: No       Family History   Problem Relation Age of Onset     Thyroid Disease Maternal Grandmother      Breast Cancer Maternal Grandmother      Asthma Brother        Review of Systems:  GENERAL: Negative pregant  SKIN: Negative no excessive dryness, no thickening  HENT: no dyshpagia, nro dysphonia nor pain in the anterior neck  EYE: Negative, no dryness, no scratchiness no double vision  HEART: +_ palpiatitions  RESPIRATORY: Negative   GI: Negative  : Negative  MSK: Negative  BLOOD/LYMPH: Negative  NEUROLOGIC: Negative   PSYCH: Negative    Physical Examination:  Blood pressure 100/67, pulse 88, weight 99.7 kg (219 lb 11.2 oz), SpO2 96 %, unknown if currently breastfeeding.  Body mass index is 34.41 kg/m .    Wt Readings from Last 4 Encounters:   20 99.7 kg (219 lb 11.2 oz)   17 73.3 kg (161 lb 8 oz)   10/17/17 69.9 kg (154 lb)       General: Well appearing pregnant woman in no distress, no moon facies, no facial plethora, here with her toddler daughter  Eyes: EOMI. Sclerae and conjunctivae are clear.   HENT: No thyromegaly or mass.    Lymphatic: No cervical or supraclavicular lymphadenopathy.  Cardiovascular: RRR, with normal S1+S2 and no murmurs.   Respiratory: Lungs are clear to auscultation.   Gastrointestinal: Abdomen is soft, non tender, and non-distended.   Skin: No rash or lesions. No abdominal striae. No supraclavicular fat pads, no dorsocervical fat pad, no vitiligo  Extremities: No peripheral edema.   Neurologic: No tremor with hands outstretched. 2+ patellar reflexes.    Labs and Studies:   Lab Results   Component Value Date    HGB 12.6 2017     Lab results from  TSH 1.08 1019 TSH 0.28    TSH 0.01    TSH below 0.01 free T3 3.39 free T4 0.95   10/18 TSH 10.22     free T3 3.5 free T4 0.8 TSH 0.01  3/18 TSH below 0.01 free T4 0.93   TSH 0.01      Assessment:  1. Hashimoto's thyroiditis    Young woman with history of subclinical hyperthyroidism and recently very high thyroperoxidase antibodies, hypothyroidism postpartum after her second child.  This presentation was most consistent with Hashimoto's thyroiditis with elements of Hashitoxicosis.  Cannot rule out mild Graves' disease.  I doubt the patient has a toxic nodule or a toxic nodular goiter as this would not present with TSH fluctuations.  As the patient is at risk for further thyroid hormone fluctuation but evaluate TSH level every 6 weeks during pregnancy and periodically about every 6 months or with symptoms after delivery.  At this point no treatment is indicated.  Overall there is no treatment for the underlying disease of Hashimoto's thyroiditis as this is autoimmune.  Any measures to control her thyroid hormones would seem excessive at this point, however they would include a total thyroidectomy with thyroid hormone replacement or treatment.     Plan:   TSH with reflex fT4 today and in 6 weeks    Orders Placed This Encounter   Procedures     TSH with free T4 reflex     TSH with free T4 reflex   TSI in 4/2020    Kaylynn Simmons MD    Endocrinology and Diabetes  [address] [address]  Pager [phone]              Again, thank you for allowing me to participate in the care of your patient.        Sincerely,        Kaylynn Simmons MD

## 2020-03-10 ENCOUNTER — HEALTH MAINTENANCE LETTER (OUTPATIENT)
Age: 25
End: 2020-03-10

## 2020-12-27 ENCOUNTER — HEALTH MAINTENANCE LETTER (OUTPATIENT)
Age: 25
End: 2020-12-27

## 2021-03-06 ENCOUNTER — HEALTH MAINTENANCE LETTER (OUTPATIENT)
Age: 26
End: 2021-03-06

## 2021-04-24 ENCOUNTER — HEALTH MAINTENANCE LETTER (OUTPATIENT)
Age: 26
End: 2021-04-24

## 2021-10-09 ENCOUNTER — HEALTH MAINTENANCE LETTER (OUTPATIENT)
Age: 26
End: 2021-10-09

## 2022-03-28 ENCOUNTER — TRANSCRIBE ORDERS (OUTPATIENT)
Dept: OTHER | Age: 27
End: 2022-03-28
Payer: COMMERCIAL

## 2022-03-28 DIAGNOSIS — G89.29 CHRONIC BILATERAL LOW BACK PAIN WITHOUT SCIATICA: Primary | ICD-10-CM

## 2022-03-28 DIAGNOSIS — M54.50 CHRONIC BILATERAL LOW BACK PAIN WITHOUT SCIATICA: Primary | ICD-10-CM

## 2022-05-21 ENCOUNTER — HEALTH MAINTENANCE LETTER (OUTPATIENT)
Age: 27
End: 2022-05-21

## 2022-09-11 ENCOUNTER — HEALTH MAINTENANCE LETTER (OUTPATIENT)
Age: 27
End: 2022-09-11

## 2023-06-03 ENCOUNTER — HEALTH MAINTENANCE LETTER (OUTPATIENT)
Age: 28
End: 2023-06-03

## 2025-04-28 NOTE — TELEPHONE ENCOUNTER
[facility] brought positive Chlamydia results to [facility] pod. No providers in OB today. Will send to ER FP providers to review/advise results and rx.    Form for [facility] filled out.   Will fax once providers have resulted and send rx. Pharmacy pended.  Linda Díaz, CMA     [Symptom and Test Evaluation] : symptom and test evaluation